# Patient Record
Sex: FEMALE | Race: WHITE | NOT HISPANIC OR LATINO | URBAN - METROPOLITAN AREA
[De-identification: names, ages, dates, MRNs, and addresses within clinical notes are randomized per-mention and may not be internally consistent; named-entity substitution may affect disease eponyms.]

---

## 2017-03-31 ENCOUNTER — FOLLOW UP (OUTPATIENT)
Dept: URBAN - METROPOLITAN AREA CLINIC 33 | Facility: CLINIC | Age: 82
End: 2017-03-31

## 2017-03-31 VITALS — WEIGHT: 230 LBS | BODY MASS INDEX: 42.33 KG/M2 | HEIGHT: 62 IN

## 2017-03-31 DIAGNOSIS — H35.342: ICD-10-CM

## 2017-03-31 DIAGNOSIS — H35.371: ICD-10-CM

## 2017-03-31 DIAGNOSIS — Z98.890: ICD-10-CM

## 2017-03-31 DIAGNOSIS — H02.833: ICD-10-CM

## 2017-03-31 DIAGNOSIS — H02.836: ICD-10-CM

## 2017-03-31 DIAGNOSIS — H43.811: ICD-10-CM

## 2017-03-31 DIAGNOSIS — H35.3132: ICD-10-CM

## 2017-03-31 DIAGNOSIS — H04.123: ICD-10-CM

## 2017-03-31 PROCEDURE — 92134 CPTRZ OPH DX IMG PST SGM RTA: CPT

## 2017-03-31 PROCEDURE — 92014 COMPRE OPH EXAM EST PT 1/>: CPT

## 2017-03-31 PROCEDURE — 4177F TALK PT/CRGVR RE AREDS PREV: CPT

## 2017-03-31 PROCEDURE — 92226 OPHTHALMOSCOPY (SUB): CPT

## 2017-03-31 PROCEDURE — 2019F DILATED MACUL EXAM DONE: CPT

## 2017-03-31 PROCEDURE — 1036F TOBACCO NON-USER: CPT

## 2017-03-31 PROCEDURE — G8417 CALC BMI ABV UP PARAM F/U: HCPCS

## 2017-03-31 ASSESSMENT — TONOMETRY
OS_IOP_MMHG: 11
OD_IOP_MMHG: 10

## 2017-03-31 ASSESSMENT — VISUAL ACUITY
OD_SC: 20/20
OS_SC: 20/40+2

## 2017-09-14 NOTE — PROCEDURE NOTE: CLINICAL
PROCEDURE NOTE: Laser for Non RD OD. Diagnosis: Retinal Hole. Anesthesia: Topical. Prior to laser, risks/benefits/alternatives to laser discussed including loss of vision, decreased peripheral and night vision, need for more laser and/or surgery and patient wished to proceed. Spot size: 200 um. Pulse power: 300 mW. Pulse duration: 100 ms. Number: 48. Procedure Time: 5:00PM. Patient tolerated procedure well. There were no complications. Post procedure instructions given. Patient given office phone number/answering service number and advised to call immediately should there be an increase in floaters or flashes, curtain coming over vision, loss of vision, or should they have any other questions or concerns. Shey Fonseca

## 2018-07-06 ENCOUNTER — FOLLOW UP (OUTPATIENT)
Dept: URBAN - METROPOLITAN AREA CLINIC 33 | Facility: CLINIC | Age: 83
End: 2018-07-06

## 2018-07-06 VITALS
BODY MASS INDEX: 46.01 KG/M2 | SYSTOLIC BLOOD PRESSURE: 122 MMHG | WEIGHT: 250 LBS | DIASTOLIC BLOOD PRESSURE: 80 MMHG | HEART RATE: 71 BPM | HEIGHT: 62 IN

## 2018-07-06 DIAGNOSIS — H35.371: ICD-10-CM

## 2018-07-06 DIAGNOSIS — Z98.890: ICD-10-CM

## 2018-07-06 DIAGNOSIS — H02.833: ICD-10-CM

## 2018-07-06 DIAGNOSIS — H02.836: ICD-10-CM

## 2018-07-06 DIAGNOSIS — H04.123: ICD-10-CM

## 2018-07-06 DIAGNOSIS — H35.3132: ICD-10-CM

## 2018-07-06 DIAGNOSIS — H35.342: ICD-10-CM

## 2018-07-06 DIAGNOSIS — H43.811: ICD-10-CM

## 2018-07-06 PROCEDURE — 92134 CPTRZ OPH DX IMG PST SGM RTA: CPT

## 2018-07-06 PROCEDURE — 92014 COMPRE OPH EXAM EST PT 1/>: CPT

## 2018-07-06 PROCEDURE — 92250 FUNDUS PHOTOGRAPHY W/I&R: CPT

## 2018-07-06 ASSESSMENT — TONOMETRY
OD_IOP_MMHG: 14
OS_IOP_MMHG: 15

## 2018-07-06 ASSESSMENT — VISUAL ACUITY
OS_CC: 20/40-2
OD_CC: 20/25+2

## 2020-07-17 ENCOUNTER — FOLLOW UP (OUTPATIENT)
Dept: URBAN - METROPOLITAN AREA CLINIC 33 | Facility: CLINIC | Age: 85
End: 2020-07-17

## 2020-07-17 VITALS — HEIGHT: 62 IN | WEIGHT: 240 LBS | BODY MASS INDEX: 44.16 KG/M2

## 2020-07-17 DIAGNOSIS — H35.342: ICD-10-CM

## 2020-07-17 DIAGNOSIS — H04.123: ICD-10-CM

## 2020-07-17 DIAGNOSIS — H35.3132: ICD-10-CM

## 2020-07-17 DIAGNOSIS — H35.371: ICD-10-CM

## 2020-07-17 DIAGNOSIS — H02.833: ICD-10-CM

## 2020-07-17 DIAGNOSIS — H02.836: ICD-10-CM

## 2020-07-17 DIAGNOSIS — Z98.890: ICD-10-CM

## 2020-07-17 DIAGNOSIS — H43.811: ICD-10-CM

## 2020-07-17 PROCEDURE — 92014 COMPRE OPH EXAM EST PT 1/>: CPT

## 2020-07-17 PROCEDURE — 92250 FUNDUS PHOTOGRAPHY W/I&R: CPT

## 2020-07-17 PROCEDURE — 92134 CPTRZ OPH DX IMG PST SGM RTA: CPT

## 2020-07-17 ASSESSMENT — VISUAL ACUITY
OS_CC: 20/40-1
OS_PH: 20/30
OD_CC: 20/20-2

## 2020-07-17 ASSESSMENT — TONOMETRY
OS_IOP_MMHG: 14
OD_IOP_MMHG: 15

## 2020-09-21 NOTE — PATIENT DISCUSSION
CATARACTS, OU- SLOWLY PROGRESSING. NOT VISUALLY SIGNIFICANT. DISC OPTION OF [id number]. GLASSES RX GIVEN TO FILL IF DESIRES.

## 2020-09-21 NOTE — PATIENT DISCUSSION
RGP WEARER - COMPLAINING OF DRYNESS AND DISCOMFORT. RECOMMEND WARM COMPRESSES, ARTIFICIAL TEARS AND FISH OIL.

## 2021-07-09 ENCOUNTER — FOLLOW UP (OUTPATIENT)
Dept: URBAN - METROPOLITAN AREA CLINIC 33 | Facility: CLINIC | Age: 86
End: 2021-07-09

## 2021-07-09 VITALS — WEIGHT: 230 LBS | HEIGHT: 61 IN | BODY MASS INDEX: 43.43 KG/M2

## 2021-07-09 DIAGNOSIS — H43.811: ICD-10-CM

## 2021-07-09 DIAGNOSIS — H35.3132: ICD-10-CM

## 2021-07-09 DIAGNOSIS — H53.8: ICD-10-CM

## 2021-07-09 DIAGNOSIS — H35.371: ICD-10-CM

## 2021-07-09 DIAGNOSIS — H35.342: ICD-10-CM

## 2021-07-09 PROCEDURE — 92250 FUNDUS PHOTOGRAPHY W/I&R: CPT

## 2021-07-09 PROCEDURE — 92134 CPTRZ OPH DX IMG PST SGM RTA: CPT

## 2021-07-09 PROCEDURE — 92014 COMPRE OPH EXAM EST PT 1/>: CPT

## 2021-07-09 ASSESSMENT — VISUAL ACUITY
OS_PH: 20/30-1
OD_CC: 20/30-2
OS_CC: 20/50+1

## 2021-07-09 ASSESSMENT — TONOMETRY
OD_IOP_MMHG: 13
OS_IOP_MMHG: 15

## 2022-08-04 ENCOUNTER — FOLLOW UP (OUTPATIENT)
Dept: URBAN - METROPOLITAN AREA CLINIC 33 | Facility: CLINIC | Age: 87
End: 2022-08-04

## 2022-08-04 VITALS — HEIGHT: 62 IN | BODY MASS INDEX: 40.48 KG/M2 | WEIGHT: 220 LBS

## 2022-08-04 DIAGNOSIS — H04.123: ICD-10-CM

## 2022-08-04 DIAGNOSIS — H35.371: ICD-10-CM

## 2022-08-04 DIAGNOSIS — H43.811: ICD-10-CM

## 2022-08-04 DIAGNOSIS — H53.8: ICD-10-CM

## 2022-08-04 DIAGNOSIS — H35.3132: ICD-10-CM

## 2022-08-04 DIAGNOSIS — H35.342: ICD-10-CM

## 2022-08-04 PROCEDURE — 92250 FUNDUS PHOTOGRAPHY W/I&R: CPT

## 2022-08-04 PROCEDURE — 92014 COMPRE OPH EXAM EST PT 1/>: CPT

## 2022-08-04 PROCEDURE — 92134 CPTRZ OPH DX IMG PST SGM RTA: CPT

## 2022-08-04 ASSESSMENT — VISUAL ACUITY
OS_PH: 20/40+2
OD_CC: 20/25-1
OS_CC: 20/40

## 2022-08-04 ASSESSMENT — TONOMETRY
OD_IOP_MMHG: 15
OS_IOP_MMHG: 16

## 2023-04-27 NOTE — PATIENT DISCUSSION
An After Visit Summary was printed and given to the patient. BMI Within normal limits, continue current management.

## 2023-11-09 ENCOUNTER — FOLLOW UP (OUTPATIENT)
Dept: URBAN - METROPOLITAN AREA CLINIC 33 | Facility: CLINIC | Age: 88
End: 2023-11-09

## 2023-11-09 VITALS — BODY MASS INDEX: 41.54 KG/M2 | HEIGHT: 61 IN | WEIGHT: 220 LBS

## 2023-11-09 DIAGNOSIS — H35.342: ICD-10-CM

## 2023-11-09 DIAGNOSIS — H43.811: ICD-10-CM

## 2023-11-09 DIAGNOSIS — H35.3132: ICD-10-CM

## 2023-11-09 DIAGNOSIS — H04.123: ICD-10-CM

## 2023-11-09 DIAGNOSIS — H35.371: ICD-10-CM

## 2023-11-09 DIAGNOSIS — H53.8: ICD-10-CM

## 2023-11-09 PROCEDURE — 92250 FUNDUS PHOTOGRAPHY W/I&R: CPT

## 2023-11-09 PROCEDURE — 92014 COMPRE OPH EXAM EST PT 1/>: CPT

## 2023-11-09 PROCEDURE — 92134 CPTRZ OPH DX IMG PST SGM RTA: CPT

## 2023-11-09 ASSESSMENT — TONOMETRY
OS_IOP_MMHG: 15
OD_IOP_MMHG: 13

## 2023-11-09 ASSESSMENT — VISUAL ACUITY
OD_CC: 20/25+2
OS_PH: 20/30-2
OS_CC: 20/40

## 2025-01-02 ENCOUNTER — COMPREHENSIVE EXAM (OUTPATIENT)
Age: 89
End: 2025-01-02

## 2025-01-02 VITALS — WEIGHT: 211 LBS | HEIGHT: 61 IN | BODY MASS INDEX: 39.84 KG/M2

## 2025-01-02 DIAGNOSIS — Z98.890: ICD-10-CM

## 2025-01-02 DIAGNOSIS — H02.836: ICD-10-CM

## 2025-01-02 DIAGNOSIS — H35.3132: ICD-10-CM

## 2025-01-02 DIAGNOSIS — H02.833: ICD-10-CM

## 2025-01-02 DIAGNOSIS — H04.123: ICD-10-CM

## 2025-01-02 DIAGNOSIS — H35.342: ICD-10-CM

## 2025-01-02 DIAGNOSIS — H43.811: ICD-10-CM

## 2025-01-02 DIAGNOSIS — H35.371: ICD-10-CM

## 2025-01-02 DIAGNOSIS — H53.8: ICD-10-CM

## 2025-01-02 PROCEDURE — 92250 FUNDUS PHOTOGRAPHY W/I&R: CPT | Mod: 59

## 2025-01-02 PROCEDURE — 92134 CPTRZ OPH DX IMG PST SGM RTA: CPT

## 2025-01-02 PROCEDURE — 92014 COMPRE OPH EXAM EST PT 1/>: CPT
